# Patient Record
Sex: MALE | Race: WHITE
[De-identification: names, ages, dates, MRNs, and addresses within clinical notes are randomized per-mention and may not be internally consistent; named-entity substitution may affect disease eponyms.]

---

## 2017-07-29 ENCOUNTER — HOSPITAL ENCOUNTER (EMERGENCY)
Dept: HOSPITAL 8 - ED | Age: 52
Discharge: HOME | End: 2017-07-29
Payer: SELF-PAY

## 2017-07-29 VITALS — WEIGHT: 183.87 LBS | BODY MASS INDEX: 22.86 KG/M2 | HEIGHT: 75 IN

## 2017-07-29 VITALS — DIASTOLIC BLOOD PRESSURE: 82 MMHG | SYSTOLIC BLOOD PRESSURE: 128 MMHG

## 2017-07-29 DIAGNOSIS — K02.9: Primary | ICD-10-CM

## 2017-07-29 PROCEDURE — 99283 EMERGENCY DEPT VISIT LOW MDM: CPT

## 2017-08-01 ENCOUNTER — HOSPITAL ENCOUNTER (EMERGENCY)
Facility: MEDICAL CENTER | Age: 52
End: 2017-08-01
Attending: EMERGENCY MEDICINE

## 2017-08-01 VITALS
BODY MASS INDEX: 22.48 KG/M2 | HEART RATE: 65 BPM | HEIGHT: 75 IN | DIASTOLIC BLOOD PRESSURE: 92 MMHG | WEIGHT: 180.78 LBS | TEMPERATURE: 97.3 F | SYSTOLIC BLOOD PRESSURE: 134 MMHG | RESPIRATION RATE: 14 BRPM | OXYGEN SATURATION: 99 %

## 2017-08-01 DIAGNOSIS — K04.7 TOOTH INFECTION: ICD-10-CM

## 2017-08-01 PROCEDURE — 99284 EMERGENCY DEPT VISIT MOD MDM: CPT

## 2017-08-01 PROCEDURE — 96375 TX/PRO/DX INJ NEW DRUG ADDON: CPT

## 2017-08-01 PROCEDURE — 96365 THER/PROPH/DIAG IV INF INIT: CPT

## 2017-08-01 PROCEDURE — 700105 HCHG RX REV CODE 258: Performed by: EMERGENCY MEDICINE

## 2017-08-01 PROCEDURE — 700101 HCHG RX REV CODE 250: Performed by: EMERGENCY MEDICINE

## 2017-08-01 PROCEDURE — 700111 HCHG RX REV CODE 636 W/ 250 OVERRIDE (IP): Performed by: EMERGENCY MEDICINE

## 2017-08-01 RX ORDER — HYDROCODONE BITARTRATE AND ACETAMINOPHEN 5; 325 MG/1; MG/1
1 TABLET ORAL EVERY 4 HOURS PRN
Qty: 20 TAB | Refills: 0 | Status: SHIPPED | OUTPATIENT
Start: 2017-08-01 | End: 2021-08-18

## 2017-08-01 RX ORDER — ONDANSETRON 2 MG/ML
4 INJECTION INTRAMUSCULAR; INTRAVENOUS ONCE
Status: COMPLETED | OUTPATIENT
Start: 2017-08-01 | End: 2017-08-01

## 2017-08-01 RX ORDER — SODIUM CHLORIDE 9 MG/ML
1000 INJECTION, SOLUTION INTRAVENOUS ONCE
Status: COMPLETED | OUTPATIENT
Start: 2017-08-01 | End: 2017-08-01

## 2017-08-01 RX ORDER — CLINDAMYCIN PHOSPHATE 600 MG/50ML
600 INJECTION, SOLUTION INTRAVENOUS ONCE
Status: COMPLETED | OUTPATIENT
Start: 2017-08-01 | End: 2017-08-01

## 2017-08-01 RX ORDER — CLINDAMYCIN HYDROCHLORIDE 150 MG/1
150 CAPSULE ORAL 4 TIMES DAILY
Qty: 28 CAP | Refills: 0 | Status: SHIPPED | OUTPATIENT
Start: 2017-08-01

## 2017-08-01 RX ORDER — ONDANSETRON 4 MG/1
4 TABLET, FILM COATED ORAL EVERY 8 HOURS PRN
Qty: 10 EACH | Refills: 0 | Status: SHIPPED | OUTPATIENT
Start: 2017-08-01

## 2017-08-01 RX ADMIN — SODIUM CHLORIDE 1000 ML: 9 INJECTION, SOLUTION INTRAVENOUS at 07:53

## 2017-08-01 RX ADMIN — HYDROMORPHONE HYDROCHLORIDE 1 MG: 1 INJECTION, SOLUTION INTRAMUSCULAR; INTRAVENOUS; SUBCUTANEOUS at 07:52

## 2017-08-01 RX ADMIN — ONDANSETRON 4 MG: 2 INJECTION INTRAMUSCULAR; INTRAVENOUS at 07:53

## 2017-08-01 RX ADMIN — CLINDAMYCIN IN 5 PERCENT DEXTROSE 600 MG: 12 INJECTION, SOLUTION INTRAVENOUS at 07:53

## 2017-08-01 ASSESSMENT — PAIN SCALES - GENERAL
PAINLEVEL_OUTOF10: 10
PAINLEVEL_OUTOF10: 9

## 2017-08-01 NOTE — ED PROVIDER NOTES
"ED Provider Note    CHIEF COMPLAINT  Chief Complaint   Patient presents with   • Dental Pain   • Oral Swelling       HPI  Lasha Colon is a 51 y.o. male who presents with left upper molar toothache and pain all but the swelling. He has an appointment to see a dentist at 9:30 this morning. He was recently seen at [facility] and given antibiotics. He was given no pain medication. No headache, no stiff neck, no chest pain, no difficulty breathing.    REVIEW OF SYSTEMS  No headache, no stiff neck, no chest pain.  ALL OTHER SYSTEMS NEGATIVE    ALLERGIES  No Known Allergies    CURRENT MEDICATIONS  Home Medications     Reviewed by Eden Hernandez R.N. (Registered Nurse) on 08/01/17 at 0723  Med List Status: Complete    Medication Last Dose Status    penicillin v potassium (VEETID) 500 MG Tab 8/1/2017 Active                PAST MEDICAL HISTORY  Dental caries. Orthopedic problems.    SURGICAL HISTORY  Past Surgical History   Procedure Laterality Date   • Other orthopedic surgery       shoulder surgery   • Dental extraction(s)  8/16/2011     Performed by NETTE STEVENS at [facility]   • Submandible abscess incision and drainage  8/16/2011     Performed by NETTE STEVENS at [facility]   • Hardware removal ortho  8/19/2011     Performed by ESTHER CARRILLO at [facility]       FAMILY HISTORY  No family history on file.    SOCIAL HISTORY  Tourette's smoker. Wife at the bedside.    PHYSICAL EXAM  GENERAL: Alert dehydrated male adult  VITAL SIGNS: /97 mmHg  Pulse 75  Temp(Src) 36.3 °C (97.3 °F)  Resp 16  Ht 1.905 m (6' 3\")  Wt 82 kg (180 lb 12.4 oz)  BMI 22.60 kg/m2  SpO2 100%   Constitutional: Alert dehydrated adult HENT: Scalp is normal size and nontender. Ears are clear. Nose is clear. Throat is clear with no stridor no drooling no trismus. Teeth until caries in the left upper incisor and molar area of his mouth. Little bit of swelling at the nasolabial fold area.  Eyes: " Pupils equal round and reactive to light, extraocular motor fall. There is no scleral icterus.  Neck: Neck is supple and nontender. There is no meningismus. No adenitis. No thyromegaly.  Lymphatic: No adenopathy.   Cardiovascular: Heart regular rhythm without murmurs or gallops   Thorax & Lungs: No chest wall tenderness. Lungs are clear. Patient has good breath sounds bilateral. No rales, no rhonchi, no wheezes.  Skin: Warm, pink, and dry with no erythema and no rash.   Extremities: Full range of motion  No tenderness to palpation and no deformities noted. No calf or thigh swelling. No calf or thigh tenderness. No clinical DVT.  Neurologic: Alert & oriented . Cranial nerves are grossly intact as tested. Patient moves all 4 extremities well. Patient has good strong flexion and extension of the ankle joints knee joints hip joints and elbow joints. Sensation is normal and symmetrical in the upper and lower extremities.   Psychiatric: Patient is alert oriented coherent and rational.       COURSE & MEDICAL DECISION MAKING  As an appointment to see a dentist at 9:30 this morning. Has had some nausea and vomiting this morning. He has left upper molar toothache.    Plan: #1 is given a bolus of IV fluids for rehydration #2. Intravenous Zofran and Dilaudid and Cleocin #3. The patient will see the dentist at 9:30 this morning. #4. The patient was given a prescription for some Cleocin and Zofran and hydrocodone.    FINAL IMPRESSION  1. Left number molar toothache with dental infection  2. Vomiting and dehydration corrected with fluids here         Electronically signed by: Gary Gansert, 8/1/2017 7:58 AM

## 2017-08-01 NOTE — ED AVS SNAPSHOT
Home Care Instructions                                                                                                                Lasha Colon   MRN: [id number]    Department:  [facility], Emergency Dept   Date of Visit:  8/1/2017            [facility], Emergency Dept    [address] [place] [id number]    Phone:  [phone]      You were seen by     Gary Gansert, M.D.      Your Diagnosis Was     Tooth infection     K04.7       These are the medications you received during your hospitalization from 08/01/2017 0642 to 08/01/2017 0824     Date/Time Order Dose Route Action    08/01/2017 0753 NS infusion 1,000 mL 1,000 mL Intravenous New Bag    08/01/2017 0752 HYDROmorphone (DILAUDID) injection 1 mg 1 mg Intravenous Given    08/01/2017 0753 ondansetron (ZOFRAN) syringe/vial injection 4 mg 4 mg Intravenous Given    08/01/2017 0753 clindamycin (CLEOCIN) IVPB premix 600 mg 600 mg Intravenous New Bag      Follow-up Information     1. Follow up with Pcp Pt States None.    Specialty:  Family Medicine        2. Follow up with [facility], Emergency Dept.    Specialty:  Emergency Medicine    Why:  see your dentist this morning. 9 AM.    Contact information    [address] [place] [id number] [phone]      Medication Information     Review all of your home medications and newly ordered medications with your primary doctor and/or pharmacist as soon as possible. Follow medication instructions as directed by your doctor and/or pharmacist.     Please keep your complete medication list with you and share with your physician. Update the information when medications are discontinued, doses are changed, or new medications (including over-the-counter products) are added; and carry medication information at all times in the event of emergency situations.               Medication List      START taking these medications        Instructions    Morning  Afternoon Evening Bedtime    clindamycin 150 MG Caps   Commonly known as:  CLEOCIN        Take 1 Cap by mouth 4 times a day.   Dose:  150 mg                        hydrocodone-acetaminophen 5-325 MG Tabs per tablet   Commonly known as:  NORCO        Take 1 Tab by mouth every four hours as needed.   Dose:  1 Tab                        ondansetron 4 MG Tabs tablet   Commonly known as:  ZOFRAN        Take 1 Tab by mouth every 8 hours as needed for Nausea/Vomiting.   Dose:  4 mg                          ASK your doctor about these medications        Instructions    Morning Afternoon Evening Bedtime    penicillin v potassium 500 MG Tabs   Commonly known as:  VEETID        Take 1 Tab by mouth 4 times a day.   Dose:  500 mg                             Where to Get Your Medications      You can get these medications from any pharmacy     Bring a paper prescription for each of these medications    - clindamycin 150 MG Caps  - hydrocodone-acetaminophen 5-325 MG Tabs per tablet  - ondansetron 4 MG Tabs tablet            Patient Information     Patient Information    Following emergency treatment: all patient requiring follow-up care must return either to a private physician or a clinic if your condition worsens before you are able to obtain further medical attention, please return to the emergency room.     Billing Information    At [facility], we work to make the billing process streamlined for our patients.  Our Representatives are here to answer any questions you may have regarding your hospital bill.  If you have insurance coverage and have supplied your insurance information to us, we will submit a claim to your insurer on your behalf.  Should you have any questions regarding your bill, we can be reached online or by phone as follows:  Online: You are able pay your bills online or live chat with our representatives about any billing questions you may have. We are here to help Monday - Friday from 8:00am to 7:30pm and  9:00am - 12:00pm on Saturdays.  Please visit [email or web address]  for more information.   Phone:  [phone] or [phone]    Please note that your emergency physician, surgeon, pathologist, radiologist, anesthesiologist, and other specialists are not employed by [facility] and will therefore bill separately for their services.  Please contact them directly for any questions concerning their bills at the numbers below:     Emergency Physician Services:  [phone]  [facility]:  [phone]  [facility]:  [phone]  [facility]:  [phone]    1. Your final bill may vary from the amount quoted upon discharge if all procedures are not complete at that time, or if your doctor has additional procedures of which we are not aware. You will receive an additional bill if you return to the Emergency Department at [facility] for suture removal regardless of the facility of which the sutures were placed.     2. Please arrange for settlement of this account at the emergency registration.    3. All self-pay accounts are due in full at the time of treatment.  If you are unable to meet this obligation then payment is expected within 4-5 days.     4. If you have had radiology studies (CT, X-ray, Ultrasound, MRI), you have received a preliminary result during your emergency department visit. Please contact the radiology department [phone] to receive a copy of your final result. Please discuss the Final result with your primary physician or with the follow up physician provided.     Crisis Hotline:  [place] Crisis Hotline:  2-172-UIKUMDG or 1-565.299.9381  [place] Crisis Hotline:    1-451.785.8317 or 915-682-7449         ED Discharge Follow Up Questions    1. In order to provide you with very good care, we would like to follow up with a phone call in the next few days.  May we have your permission to contact you?     YES /   NO    2. What is the best phone number to call you? (       )_____-__________    3. What is the best time to call you?      Morning  /  Afternoon  /  Evening                   Patient Signature:  ____________________________________________________________    Date:  ____________________________________________________________

## 2017-08-01 NOTE — ED AVS SNAPSHOT
8/1/2017    Lasha Colon  [address] [place] [address]    Dear Lasha:    [facility] wants to ensure your discharge home is safe and you or your loved ones have had all of your questions answered regarding your care after you leave the hospital.    Below is a list of resources and contact information should you have any questions regarding your hospital stay, follow-up instructions, or active medical symptoms.    Questions or Concerns Regarding… Contact   Medical Questions Related to Your Discharge  (7 days a week, 8am-5pm) Contact a Nurse Care Coordinator   [phone]   Medical Questions Not Related to Your Discharge  (24 hours a day / 7 days a week)  Contact the Nurse Health Line   [phone]    Medications or Discharge Instructions Refer to your discharge packet   or contact your [facility] Primary Care Provider   [phone]   Follow-up Appointment(s) Schedule your appointment via Exo   or contact Scheduling [phone]   Billing Review your statement via Exo  or contact Billing [phone]   Medical Records Review your records via Exo   or contact Medical Records [phone]     You may receive a telephone call within two days of discharge. This call is to make certain you understand your discharge instructions and have the opportunity to have any questions answered. You can also easily access your medical information, test results and upcoming appointments via the Exo free online health management tool. You can learn more and sign up at [employer]Exo. For assistance setting up your Exo account, please call [phone].    Once again, we want to ensure your discharge home is safe and that you have a clear understanding of any next steps in your care. If you have any questions or concerns, please do not hesitate to contact us, we are here for you. Thank you for choosing [facility] for your healthcare needs.    Sincerely,    Your [facility] Healthcare Team

## 2017-08-01 NOTE — ED NOTES
Pt to rm y67 c/o dental pain, swelling to left upper face increasing since Saturday. Pt states he has appnt with dentist at 09:30

## 2017-08-01 NOTE — ED NOTES
"Chief Complaint   Patient presents with   • Dental Pain   • Oral Swelling     Pt went to [facility] Saturday and was given antibiotics. Pt reports increase in swelling to L upper jaw/teeth. Pt reports a few years ago he had similar symptoms and had to be intubated due to swelling.  Pt speaking in full sentences, managing oral secretions.      /97 mmHg  Pulse 75  Temp(Src) 36.3 °C (97.3 °F)  Resp 16  Ht 1.905 m (6' 3\")  Wt 82 kg (180 lb 12.4 oz)  BMI 22.60 kg/m2  SpO2 100%      Pt Informed regarding triage process and verbalized understanding to inform triage tech or RN for any changes in condition.  Placed in lobby.    "

## 2017-08-01 NOTE — ED AVS SNAPSHOT
Blue Pillar Access Code: [email or web address]  Expires: 8/31/2017  8:24 AM    Blue Pillar  A secure, online tool to manage your health information     [employer]’s Blue Pillar® is a secure, online tool that connects you to your personalized health information from the privacy of your home -- day or night - making it very easy for you to manage your healthcare. Once the activation process is completed, you can even access your medical information using the Blue Pillar yang, which is available for free in the Apple Yang store or Google Play store.     Blue Pillar provides the following levels of access (as shown below):   My Chart Features   [facility] Primary Care Doctor [facility]  Specialists [facility]  Urgent  Care Non-[facility]  Primary Care  Doctor   Email your healthcare team securely and privately 24/7 X X X X   Manage appointments: schedule your next appointment; view details of past/upcoming appointments X      Request prescription refills. X      View recent personal medical records, including lab and immunizations X X X X   View health record, including health history, allergies, medications X X X X   Read reports about your outpatient visits, procedures, consult and ER notes X X X X   See your discharge summary, which is a recap of your hospital and/or ER visit that includes your diagnosis, lab results, and care plan. X X       How to register for Blue Pillar:  1. Go to  [email or web address].  2. Click on the Sign Up Now box, which takes you to the New Member Sign Up page. You will need to provide the following information:  a. Enter your Blue Pillar Access Code exactly as it appears at the top of this page. (You will not need to use this code after you’ve completed the sign-up process. If you do not sign up before the expiration date, you must request a new code.)   b. Enter your date of birth.   c. Enter your home email address.   d. Click Submit, and follow the next screen’s instructions.  3. Create a Blue Pillar ID. This will be your Blue Pillar  login ID and cannot be changed, so think of one that is secure and easy to remember.  4. Create a Tutto password. You can change your password at any time.  5. Enter your Password Reset Question and Answer. This can be used at a later time if you forget your password.   6. Enter your e-mail address. This allows you to receive e-mail notifications when new information is available in Tutto.  7. Click Sign Up. You can now view your health information.    For assistance activating your Tutto account, call [phone]

## 2020-05-25 ENCOUNTER — HOSPITAL ENCOUNTER (EMERGENCY)
Facility: MEDICAL CENTER | Age: 55
End: 2020-05-25
Attending: EMERGENCY MEDICINE

## 2020-05-25 VITALS
DIASTOLIC BLOOD PRESSURE: 87 MMHG | HEIGHT: 75 IN | RESPIRATION RATE: 17 BRPM | BODY MASS INDEX: 22.5 KG/M2 | HEART RATE: 65 BPM | TEMPERATURE: 98 F | OXYGEN SATURATION: 98 % | WEIGHT: 181 LBS | SYSTOLIC BLOOD PRESSURE: 135 MMHG

## 2020-05-25 DIAGNOSIS — K08.89 PAIN, DENTAL: ICD-10-CM

## 2020-05-25 PROCEDURE — 99283 EMERGENCY DEPT VISIT LOW MDM: CPT

## 2020-05-25 PROCEDURE — 700102 HCHG RX REV CODE 250 W/ 637 OVERRIDE(OP): Performed by: EMERGENCY MEDICINE

## 2020-05-25 PROCEDURE — A9270 NON-COVERED ITEM OR SERVICE: HCPCS | Performed by: EMERGENCY MEDICINE

## 2020-05-25 RX ORDER — HYDROCODONE BITARTRATE AND ACETAMINOPHEN 5; 325 MG/1; MG/1
1 TABLET ORAL EVERY 4 HOURS PRN
Qty: 8 TAB | Refills: 0 | Status: SHIPPED | OUTPATIENT
Start: 2020-05-25 | End: 2020-05-28

## 2020-05-25 RX ORDER — PENICILLIN V POTASSIUM 500 MG/1
500 TABLET ORAL 4 TIMES DAILY
Qty: 28 TAB | Refills: 0 | Status: SHIPPED | OUTPATIENT
Start: 2020-05-25 | End: 2020-06-01

## 2020-05-25 RX ORDER — HYDROCODONE BITARTRATE AND ACETAMINOPHEN 5; 325 MG/1; MG/1
1 TABLET ORAL ONCE
Status: COMPLETED | OUTPATIENT
Start: 2020-05-25 | End: 2020-05-25

## 2020-05-25 RX ORDER — PENICILLIN V POTASSIUM 500 MG/1
500 TABLET ORAL ONCE
Status: COMPLETED | OUTPATIENT
Start: 2020-05-25 | End: 2020-05-25

## 2020-05-25 RX ADMIN — PENICILLIN V POTASIUM 500 MG: 500 TABLET OROPHARYNGEAL at 23:19

## 2020-05-25 RX ADMIN — HYDROCODONE BITARTRATE AND ACETAMINOPHEN 1 TABLET: 5; 325 TABLET ORAL at 23:19

## 2020-05-26 NOTE — ED NOTES
Patient ambulated to lobby with steady gait, girlfriend is driving him home. Patient given 2 rx and educated on both. Patient states he has a dentist and they are trying to get him this week.

## 2020-05-26 NOTE — ED TRIAGE NOTES
"Chief Complaint   Patient presents with   • Dental Pain     PT reports dental pain increasing over last 2 days and otc meds no longer working.     Blood Pressure 146/101   Pulse 77   Temperature 36.4 °C (97.5 °F) (Oral)   Respiration 20   Height 1.905 m (6' 3\")   Weight 82.1 kg (181 lb)   Oxygen Saturation 99%   Body Mass Index 22.62 kg/m²     "

## 2020-05-26 NOTE — ED PROVIDER NOTES
"ED Provider Note    CHIEF COMPLAINT  Chief Complaint   Patient presents with   • Dental Pain     PT reports dental pain increasing over last 2 days and otc meds no longer working.       HPI  Lasha Colon is a 54 y.o. male who presents with right upper dental pain.  The patient states that the pain is developed over the last 2 days.  He denies any associated swelling or redness surrounding the tooth.  He has not had any fevers or vomiting.  He attempted to call his dentist over the holiday weekend and should have an appointment for follow-up tomorrow.  He has been taking over-the-counter ibuprofen and Tylenol without any relief.    REVIEW OF SYSTEMS  See HPI for further details.   Positive for dental pain  Negative for fevers, vomiting, facial swelling    PAST MEDICAL HISTORY       SOCIAL HISTORY  Social History     Tobacco Use   • Smoking status: Current Every Day Smoker     Packs/day: 0.50     Years: 25.00     Pack years: 12.50     Types: Cigarettes   • Tobacco comment: 2 cig a day   Substance and Sexual Activity   • Alcohol use: No   • Drug use: No   • Sexual activity: Not on file       SURGICAL HISTORY   has a past surgical history that includes other orthopedic surgery; dental extraction(s) (8/16/2011); submandible abscess incision and drainage (8/16/2011); and hardware removal ortho (8/19/2011).    CURRENT MEDICATIONS  Home Medications    **Home medications have not yet been reviewed for this encounter**         ALLERGIES  No Known Allergies    PHYSICAL EXAM  VITAL SIGNS: /87   Pulse 65   Temp 36.7 °C (98 °F)   Resp 17   Ht 1.905 m (6' 3\")   Wt 82.1 kg (181 lb)   SpO2 98%   BMI 22.62 kg/m²    Constitutional: Nontoxic appearing middle-aged male. alert in no apparent distress.  HENT: Normocephalic, Atraumatic. Bilateral external ears normal. Nose normal. Moist mucous membranes.  No trismus or submandibular fullness.  Poor dentition noted.  2 upper left-sided molars with severe decay and exposed " "root.  No surrounding erythema or fluctuance.  Neck: Supple, full range of motion.  Eyes: Pupils are equal and reactive. Conjunctiva normal.   Skin: Warm, Dry. No rash.   Musculoskeletal: Atraumatic, no deformities noted.   Neurologic: Alert and oriented. Moving all extremities spontaneously  Psychiatric: Affect normal, Mood normal. Appears appropriate and not intoxicated.       DIAGNOSTIC STUDIES        ED COURSE  Vitals:    05/25/20 2109 05/25/20 2314   BP: 146/101 135/87   Pulse: 77 65   Resp: 20 17   Temp: 36.4 °C (97.5 °F) 36.7 °C (98 °F)   TempSrc: Oral    SpO2: 99% 98%   Weight: 82.1 kg (181 lb)    Height: 1.905 m (6' 3\")          Medications administered:  Medications   HYDROcodone-acetaminophen (NORCO) 5-325 MG per tablet 1 Tab (1 Tab Oral Given 5/25/20 2319)   penicillin v potassium (VEETID) tablet 500 mg (500 mg Oral Given 5/25/20 2319)       MEDICAL DECISION MAKING  Patient presents with 2-day history of dental pain.  He is afebrile with normal vital signs on arrival.  There is no signs for significant infection including Adryan's angina or large abscess.  He has evidence of exposed root due to severe decay.  I will place him on antibiotics in case of developing infection as well as provide him with pain control until he is able to follow-up with his dentist in the morning.  Patient understands plan of care and strict return precautions for changing or worsening symptoms.        IMPRESSION  (K08.89) Pain, dental    Disposition: Discharge home, stable condition  Results, diagnoses, and treatment options were discussed with the patient and/or family. Patient verbalized understanding of plan of care and strict return precautions prior to discharge.    Patient referred to primary care provider for monitoring and treatment of blood pressure.      Discharge Medication List as of 5/25/2020 11:14 PM      START taking these medications    Details   !! HYDROcodone-acetaminophen (NORCO) 5-325 MG Tab per tablet Take " 1 Tab by mouth every four hours as needed for up to 3 days., Disp-8 Tab,R-0, Print Rx Paper      !! penicillin v potassium (VEETID) 500 MG Tab Take 1 Tab by mouth 4 times a day for 7 days., Disp-28 Tab,R-0, Print Rx Paper       !! - Potential duplicate medications found. Please discuss with provider.        In prescribing controlled substances to this patient, I certify that I have obtained and reviewed the medical history of Lasha Colon. I have also made a good stanley effort to obtain applicable records from other providers who have treated the patient and records did not demonstrate any increased risk of substance abuse that would prevent me from prescribing controlled substances.     I have conducted a physical exam and documented it. I have reviewed Mr. Colon’s prescription history as maintained by the [place] Prescription Monitoring Program.     I have assessed the patient’s risk for abuse, dependency, and addiction using the validated Opioid Risk Tool available at https://www.mdcalc.com/etworq-xhtv-enzb-ort-narcotic-abuse.     Given the above, I believe the benefits of controlled substance therapy outweigh the risks. The reasons for prescribing controlled substances include non-narcotic, oral analgesic alternatives have been inadequate for pain control. Accordingly, I have discussed the risk and benefits, treatment plan, and alternative therapies with the patient.         Electronically signed by: Nayla Cardona M.D., 5/25/2020 11:08 PM

## 2021-08-18 ENCOUNTER — PHARMACY VISIT (OUTPATIENT)
Dept: PHARMACY | Facility: MEDICAL CENTER | Age: 56
End: 2021-08-18
Payer: COMMERCIAL

## 2021-08-18 ENCOUNTER — HOSPITAL ENCOUNTER (EMERGENCY)
Facility: MEDICAL CENTER | Age: 56
End: 2021-08-18
Attending: EMERGENCY MEDICINE

## 2021-08-18 VITALS
SYSTOLIC BLOOD PRESSURE: 135 MMHG | TEMPERATURE: 97 F | HEART RATE: 61 BPM | OXYGEN SATURATION: 99 % | RESPIRATION RATE: 16 BRPM | DIASTOLIC BLOOD PRESSURE: 104 MMHG

## 2021-08-18 DIAGNOSIS — K04.7 DENTAL INFECTION: ICD-10-CM

## 2021-08-18 DIAGNOSIS — K08.89 PAIN, DENTAL: ICD-10-CM

## 2021-08-18 PROCEDURE — RXMED WILLOW AMBULATORY MEDICATION CHARGE: Performed by: EMERGENCY MEDICINE

## 2021-08-18 PROCEDURE — 99282 EMERGENCY DEPT VISIT SF MDM: CPT

## 2021-08-18 RX ORDER — HYDROCODONE BITARTRATE AND ACETAMINOPHEN 5; 325 MG/1; MG/1
1 TABLET ORAL EVERY 4 HOURS PRN
Qty: 15 TABLET | Refills: 0 | Status: SHIPPED | OUTPATIENT
Start: 2021-08-18 | End: 2021-08-23

## 2021-08-18 RX ORDER — PENICILLIN V POTASSIUM 500 MG/1
500 TABLET ORAL
Qty: 40 TABLET | Refills: 0 | Status: SHIPPED | OUTPATIENT
Start: 2021-08-18 | End: 2021-08-28

## 2021-08-18 RX ORDER — PENICILLIN V POTASSIUM 500 MG/1
500 TABLET ORAL
Qty: 40 TABLET | Refills: 0 | Status: SHIPPED | OUTPATIENT
Start: 2021-08-18 | End: 2021-08-18 | Stop reason: SDUPTHER

## 2021-08-18 NOTE — ED TRIAGE NOTES
Lasha Colon  55 y.o.  male  Chief Complaint   Patient presents with   • Dental Pain     Started 2 days ago, right upper molar, ++ increased in severity since yesterday afternoon. Taking tylenol & advil with no relief. Dental decay noted to tooth, no facial swelling.        Patient educated on triage process, to alert staff if any changes in condition.

## 2021-08-18 NOTE — ED PROVIDER NOTES
ED Provider Note    Scribed for Herbert Rosales M.D. by Brynn Mitchell. 8/18/2021, 7:39 AM.    Primary care provider: Pcp Pt States None  Means of arrival: Walk in  History obtained from: Patient  History limited by: None    CHIEF COMPLAINT  Chief Complaint   Patient presents with    Dental Pain     Started 2 days ago, right upper molar, ++ increased in severity since yesterday afternoon. Taking tylenol & advil with no relief. Dental decay noted to tooth, no facial swelling.        HPI  Lasha Colon is a 55 y.o. male who presents to the Emergency Department for evaluation of upper right dental pain onset two days ago. Patient states he does not drink. He finds some alleviation by holding cold water in his mouth. He denies numbness or tingling. Patient had an infection in his jaw a few weeks ago. He had some teeth pulled out. No alleviating factors were reported. Patient will try seeing a dentist by tomorrow morning.      REVIEW OF SYSTEMS  Pertinent positives include upper right dental pain.   Pertinent negatives include no numbness or tingling.         PAST MEDICAL HISTORY       SURGICAL HISTORY   has a past surgical history that includes other orthopedic surgery; dental extraction(s) (8/16/2011); submandible abscess incision and drainage (8/16/2011); and hardware removal ortho (8/19/2011).    SOCIAL HISTORY  Social History     Tobacco Use    Smoking status: Current Every Day Smoker     Packs/day: 0.50     Years: 25.00     Pack years: 12.50     Types: Cigarettes    Tobacco comment: 2 cig a day   Substance Use Topics    Alcohol use: No    Drug use: No      Social History     Substance and Sexual Activity   Drug Use No       FAMILY HISTORY  No family history noted    CURRENT MEDICATIONS  Home Medications       Reviewed by Gayathri Soto R.N. (Registered Nurse) on 08/18/21 at 0552  Med List Status: Not Addressed     Medication Last Dose Status   clindamycin (CLEOCIN) 150 MG Cap not taking Active    hydrocodone-acetaminophen (NORCO) 5-325 MG Tab per tablet not taking Active   ondansetron (ZOFRAN) 4 MG Tab tablet not taking Active   penicillin v potassium (VEETID) 500 MG Tab not taking Active                    ALLERGIES  No Known Allergies    PHYSICAL EXAM  VITAL SIGNS: /94   Pulse 68   Temp 36.1 °C (97 °F) (Temporal)   Resp 14   SpO2 99%     Constitutional: Awake, alert, in no acute distress, Non-toxic appearance.   HENT: Mucus membranes moist.  Oropharynx is clear.  Multiple missing teeth, Tooth number 2 is mostly eroded, There are scattered caries.  There is no facial edema.  There is tenderness over upper right oral region.  Tongue is normal.  Floor of the mouth is normal.  Submental space is soft.  Posterior pharynx is normal.  Patient is tolerating secretions without difficulty. There is no evidence of Adryan's Angina.  Eyes: PERRL, EOMI, conjunctiva moist, noninjected.  Neck: Nontender, Normal range of motion, No nuchal rigidity, No stridor.   Lymphatic: No lymphadenopathy noted.   Cardiovascular: Regular rate and rhythm, no murmurs, rubs, gallops.  Thorax & Lungs:  Good breath sounds bilaterally, no wheezes, rales, or retractions.  No chest tenderness.   Abdomen: Soft, nontender, nondistended  Extremities: No edema, No tenderness.   Skin: Warm, Dry, No rashes.   Neurologic: Alert, sensory and motor function normal. No focal deficits.   Psychiatric: Affect normal, Judgment normal, Mood normal. Appropriate for clinical situation    COURSE & MEDICAL DECISION MAKING  Nursing notes, VS, PMSFHx reviewed in chart.     7:39 AM - Patient seen and examined at bedside. I informed the patient of need for antibiotics to treat infection. Patient may try using orajel at home to alleviate tooth pain. I discussed plan for discharge and follow up as outlined below. The patient verbalizes they feel comfortable going home. The patient is stable for discharge at this time and will return for any new or worsening  symptoms. Patient verbalizes understanding and support with my plan for discharge.       HTN/IDDM FOLLOW UP:  The patient is referred to a primary physician for blood pressure management, diabetic screening, and for all other preventive health concerns      DISPOSITION:  Patient will be discharged home in stable condition.    FOLLOW UP:  [facility], Emergency Dept  [address] [id number] [phone]    If symptoms worsen    your dentist            OUTPATIENT MEDICATIONS:  New Prescriptions    HYDROCODONE-ACETAMINOPHEN (NORCO) 5-325 MG TAB PER TABLET    Take 1 Tablet by mouth every four hours as needed for up to 5 days.    PENICILLIN V POTASSIUM (VEETID) 500 MG TAB    Take 1 Tablet by mouth 4 Times a Day,Before Meals and at Bedtime for 10 days.       The patient was discharged home with an information sheet and told to return immediately for any signs or symptoms listed.  The patient agreed to the discharge precautions and follow-up plan which is documented in EPIC.    FINAL IMPRESSION  1. Pain, dental    2. Dental infection          Brynn GAMEZ (Mao), am scribing for, and in the presence of, Herbert Rosales M.D..    Electronically signed by: Brynn Mitchell (Mao), 8/18/2021    Herbert GAMEZ M.D. personally performed the services described in this documentation, as scribed by Brynn Mitchell in my presence, and it is both accurate and complete. E    The note accurately reflects work and decisions made by me.  Herbert Rosales M.D.  8/18/2021  2:33 PM